# Patient Record
Sex: FEMALE | Race: WHITE | Employment: OTHER | URBAN - METROPOLITAN AREA
[De-identification: names, ages, dates, MRNs, and addresses within clinical notes are randomized per-mention and may not be internally consistent; named-entity substitution may affect disease eponyms.]

---

## 2020-10-22 LAB
HBV SURFACE AG SERPL QL IA: NONREACTIVE
HIV 1+2 AB+HIV1 P24 AG SERPL QL IA: NONREACTIVE

## 2021-02-25 LAB — GLU GEST SCREEN 1HR 50G: 115

## 2021-04-30 LAB — GROUP B STREP PCR: NEGATIVE

## 2021-06-10 ENCOUNTER — ANESTHESIA EVENT (OUTPATIENT)
Dept: OBGYN | Facility: CLINIC | Age: 31
End: 2021-06-10
Payer: COMMERCIAL

## 2021-06-10 ENCOUNTER — ANESTHESIA (OUTPATIENT)
Dept: OBGYN | Facility: CLINIC | Age: 31
End: 2021-06-10
Payer: COMMERCIAL

## 2021-06-10 ENCOUNTER — HOSPITAL ENCOUNTER (INPATIENT)
Facility: CLINIC | Age: 31
LOS: 2 days | Discharge: HOME | End: 2021-06-12
Attending: ADVANCED PRACTICE MIDWIFE | Admitting: ADVANCED PRACTICE MIDWIFE
Payer: COMMERCIAL

## 2021-06-10 PROBLEM — O16.3 ELEVATED BLOOD PRESSURE AFFECTING PREGNANCY IN THIRD TRIMESTER, ANTEPARTUM: Status: ACTIVE | Noted: 2021-06-10

## 2021-06-10 PROBLEM — O13.3 GESTATIONAL HYPERTENSION, THIRD TRIMESTER: Status: ACTIVE | Noted: 2021-06-10

## 2021-06-10 PROBLEM — Z34.93 PREGNANCY WITH PRENATAL CARE ELSEWHERE IN THIRD TRIMESTER: Status: ACTIVE | Noted: 2021-06-10

## 2021-06-10 LAB
ABO + RH BLD: NORMAL
ABO + RH BLD: NORMAL
ALT SERPL W P-5'-P-CCNC: 23 U/L (ref 0–50)
ANION GAP SERPL CALCULATED.3IONS-SCNC: 11 MMOL/L (ref 3–14)
AST SERPL W P-5'-P-CCNC: 17 U/L (ref 0–45)
BASOPHILS # BLD AUTO: 0 10E9/L (ref 0–0.2)
BASOPHILS NFR BLD AUTO: 0.2 %
BLD GP AB SCN SERPL QL: NORMAL
BLOOD BANK CMNT PATIENT-IMP: NORMAL
BUN SERPL-MCNC: 8 MG/DL (ref 7–30)
CALCIUM SERPL-MCNC: 9.4 MG/DL (ref 8.5–10.1)
CHLORIDE SERPL-SCNC: 102 MMOL/L (ref 94–109)
CO2 SERPL-SCNC: 19 MMOL/L (ref 20–32)
CREAT SERPL-MCNC: 0.7 MG/DL (ref 0.52–1.04)
CREAT UR-MCNC: 39 MG/DL
DIFFERENTIAL METHOD BLD: ABNORMAL
EOSINOPHIL # BLD AUTO: 0 10E9/L (ref 0–0.7)
EOSINOPHIL NFR BLD AUTO: 0 %
ERYTHROCYTE [DISTWIDTH] IN BLOOD BY AUTOMATED COUNT: 12.9 % (ref 10–15)
GFR SERPL CREATININE-BSD FRML MDRD: >90 ML/MIN/{1.73_M2}
GLUCOSE SERPL-MCNC: 107 MG/DL (ref 70–99)
HCT VFR BLD AUTO: 37.8 % (ref 35–47)
HGB BLD-MCNC: 12.9 G/DL (ref 11.7–15.7)
IMM GRANULOCYTES # BLD: 0.1 10E9/L (ref 0–0.4)
IMM GRANULOCYTES NFR BLD: 0.6 %
LABORATORY COMMENT REPORT: NORMAL
LYMPHOCYTES # BLD AUTO: 1.8 10E9/L (ref 0.8–5.3)
LYMPHOCYTES NFR BLD AUTO: 8.8 %
MCH RBC QN AUTO: 30.4 PG (ref 26.5–33)
MCHC RBC AUTO-ENTMCNC: 34.1 G/DL (ref 31.5–36.5)
MCV RBC AUTO: 89 FL (ref 78–100)
MONOCYTES # BLD AUTO: 0.7 10E9/L (ref 0–1.3)
MONOCYTES NFR BLD AUTO: 3.6 %
NEUTROPHILS # BLD AUTO: 17.4 10E9/L (ref 1.6–8.3)
NEUTROPHILS NFR BLD AUTO: 86.8 %
NRBC # BLD AUTO: 0 10*3/UL
NRBC BLD AUTO-RTO: 0 /100
PLATELET # BLD AUTO: 186 10E9/L (ref 150–450)
POTASSIUM SERPL-SCNC: 3.8 MMOL/L (ref 3.4–5.3)
PROT UR-MCNC: 0.12 G/L
PROT/CREAT 24H UR: 0.31 G/G CR (ref 0–0.2)
RBC # BLD AUTO: 4.24 10E12/L (ref 3.8–5.2)
SARS-COV-2 RNA RESP QL NAA+PROBE: NEGATIVE
SODIUM SERPL-SCNC: 132 MMOL/L (ref 133–144)
SPECIMEN EXP DATE BLD: NORMAL
SPECIMEN SOURCE: NORMAL
URATE SERPL-MCNC: 6.2 MG/DL (ref 2.6–6)
WBC # BLD AUTO: 20.1 10E9/L (ref 4–11)

## 2021-06-10 PROCEDURE — 84156 ASSAY OF PROTEIN URINE: CPT | Performed by: ADVANCED PRACTICE MIDWIFE

## 2021-06-10 PROCEDURE — 86901 BLOOD TYPING SEROLOGIC RH(D): CPT | Performed by: ADVANCED PRACTICE MIDWIFE

## 2021-06-10 PROCEDURE — 86780 TREPONEMA PALLIDUM: CPT | Performed by: ADVANCED PRACTICE MIDWIFE

## 2021-06-10 PROCEDURE — 250N000011 HC RX IP 250 OP 636: Performed by: STUDENT IN AN ORGANIZED HEALTH CARE EDUCATION/TRAINING PROGRAM

## 2021-06-10 PROCEDURE — 250N000009 HC RX 250: Performed by: STUDENT IN AN ORGANIZED HEALTH CARE EDUCATION/TRAINING PROGRAM

## 2021-06-10 PROCEDURE — 86850 RBC ANTIBODY SCREEN: CPT | Performed by: ADVANCED PRACTICE MIDWIFE

## 2021-06-10 PROCEDURE — 258N000003 HC RX IP 258 OP 636

## 2021-06-10 PROCEDURE — 370N000003 HC ANESTHESIA WARD SERVICE

## 2021-06-10 PROCEDURE — 80048 BASIC METABOLIC PNL TOTAL CA: CPT | Performed by: ADVANCED PRACTICE MIDWIFE

## 2021-06-10 PROCEDURE — 84550 ASSAY OF BLOOD/URIC ACID: CPT | Performed by: ADVANCED PRACTICE MIDWIFE

## 2021-06-10 PROCEDURE — 120N000002 HC R&B MED SURG/OB UMMC

## 2021-06-10 PROCEDURE — 84460 ALANINE AMINO (ALT) (SGPT): CPT | Performed by: ADVANCED PRACTICE MIDWIFE

## 2021-06-10 PROCEDURE — 87635 SARS-COV-2 COVID-19 AMP PRB: CPT | Performed by: ADVANCED PRACTICE MIDWIFE

## 2021-06-10 PROCEDURE — 258N000003 HC RX IP 258 OP 636: Performed by: ADVANCED PRACTICE MIDWIFE

## 2021-06-10 PROCEDURE — 85025 COMPLETE CBC W/AUTO DIFF WBC: CPT | Performed by: ADVANCED PRACTICE MIDWIFE

## 2021-06-10 PROCEDURE — 3E0R3BZ INTRODUCTION OF ANESTHETIC AGENT INTO SPINAL CANAL, PERCUTANEOUS APPROACH: ICD-10-PCS | Performed by: ADVANCED PRACTICE MIDWIFE

## 2021-06-10 PROCEDURE — 84450 TRANSFERASE (AST) (SGOT): CPT | Performed by: ADVANCED PRACTICE MIDWIFE

## 2021-06-10 PROCEDURE — 00HU33Z INSERTION OF INFUSION DEVICE INTO SPINAL CANAL, PERCUTANEOUS APPROACH: ICD-10-PCS | Performed by: ADVANCED PRACTICE MIDWIFE

## 2021-06-10 PROCEDURE — 86900 BLOOD TYPING SEROLOGIC ABO: CPT | Performed by: ADVANCED PRACTICE MIDWIFE

## 2021-06-10 RX ORDER — MISOPROSTOL 200 UG/1
TABLET ORAL
Status: DISCONTINUED
Start: 2021-06-10 | End: 2021-06-11 | Stop reason: HOSPADM

## 2021-06-10 RX ORDER — OXYTOCIN/0.9 % SODIUM CHLORIDE 30/500 ML
1-24 PLASTIC BAG, INJECTION (ML) INTRAVENOUS CONTINUOUS
Status: DISCONTINUED | OUTPATIENT
Start: 2021-06-10 | End: 2021-06-11

## 2021-06-10 RX ORDER — FENTANYL CITRATE 50 UG/ML
INJECTION, SOLUTION INTRAMUSCULAR; INTRAVENOUS PRN
Status: DISCONTINUED | OUTPATIENT
Start: 2021-06-10 | End: 2021-06-11

## 2021-06-10 RX ORDER — MAGNESIUM SULFATE HEPTAHYDRATE 40 MG/ML
4 INJECTION, SOLUTION INTRAVENOUS
Status: DISCONTINUED | OUTPATIENT
Start: 2021-06-10 | End: 2021-06-11

## 2021-06-10 RX ORDER — OXYCODONE AND ACETAMINOPHEN 5; 325 MG/1; MG/1
1 TABLET ORAL
Status: DISCONTINUED | OUTPATIENT
Start: 2021-06-10 | End: 2021-06-11

## 2021-06-10 RX ORDER — CALCIUM GLUCONATE 94 MG/ML
1 INJECTION, SOLUTION INTRAVENOUS
Status: DISCONTINUED | OUTPATIENT
Start: 2021-06-10 | End: 2021-06-11

## 2021-06-10 RX ORDER — NALOXONE HYDROCHLORIDE 0.4 MG/ML
0.4 INJECTION, SOLUTION INTRAMUSCULAR; INTRAVENOUS; SUBCUTANEOUS
Status: DISCONTINUED | OUTPATIENT
Start: 2021-06-10 | End: 2021-06-11

## 2021-06-10 RX ORDER — PRENATAL VIT/IRON FUM/FOLIC AC 27MG-0.8MG
1 TABLET ORAL DAILY
COMMUNITY

## 2021-06-10 RX ORDER — SODIUM CHLORIDE, SODIUM LACTATE, POTASSIUM CHLORIDE, CALCIUM CHLORIDE 600; 310; 30; 20 MG/100ML; MG/100ML; MG/100ML; MG/100ML
INJECTION, SOLUTION INTRAVENOUS
Status: COMPLETED
Start: 2021-06-10 | End: 2021-06-10

## 2021-06-10 RX ORDER — HYDRALAZINE HYDROCHLORIDE 20 MG/ML
10 INJECTION INTRAMUSCULAR; INTRAVENOUS
Status: DISCONTINUED | OUTPATIENT
Start: 2021-06-10 | End: 2021-06-11

## 2021-06-10 RX ORDER — CARBOPROST TROMETHAMINE 250 UG/ML
250 INJECTION, SOLUTION INTRAMUSCULAR
Status: DISCONTINUED | OUTPATIENT
Start: 2021-06-10 | End: 2021-06-11

## 2021-06-10 RX ORDER — OMEGA-3 FATTY ACIDS/FISH OIL 300-1000MG
CAPSULE ORAL
COMMUNITY

## 2021-06-10 RX ORDER — NALOXONE HYDROCHLORIDE 0.4 MG/ML
0.2 INJECTION, SOLUTION INTRAMUSCULAR; INTRAVENOUS; SUBCUTANEOUS
Status: DISCONTINUED | OUTPATIENT
Start: 2021-06-10 | End: 2021-06-11

## 2021-06-10 RX ORDER — IBUPROFEN 800 MG/1
800 TABLET, FILM COATED ORAL
Status: DISCONTINUED | OUTPATIENT
Start: 2021-06-10 | End: 2021-06-11

## 2021-06-10 RX ORDER — LIDOCAINE 40 MG/G
CREAM TOPICAL
Status: DISCONTINUED | OUTPATIENT
Start: 2021-06-10 | End: 2021-06-11

## 2021-06-10 RX ORDER — LIDOCAINE HYDROCHLORIDE 10 MG/ML
INJECTION, SOLUTION EPIDURAL; INFILTRATION; INTRACAUDAL; PERINEURAL
Status: DISCONTINUED
Start: 2021-06-10 | End: 2021-06-11 | Stop reason: HOSPADM

## 2021-06-10 RX ORDER — NALBUPHINE HYDROCHLORIDE 10 MG/ML
2.5-5 INJECTION, SOLUTION INTRAMUSCULAR; INTRAVENOUS; SUBCUTANEOUS EVERY 6 HOURS PRN
Status: DISCONTINUED | OUTPATIENT
Start: 2021-06-10 | End: 2021-06-11

## 2021-06-10 RX ORDER — OXYTOCIN/0.9 % SODIUM CHLORIDE 30/500 ML
100-340 PLASTIC BAG, INJECTION (ML) INTRAVENOUS CONTINUOUS PRN
Status: COMPLETED | OUTPATIENT
Start: 2021-06-10 | End: 2021-06-11

## 2021-06-10 RX ORDER — MAGNESIUM SULFATE HEPTAHYDRATE 500 MG/ML
4 INJECTION, SOLUTION INTRAMUSCULAR; INTRAVENOUS
Status: DISCONTINUED | OUTPATIENT
Start: 2021-06-10 | End: 2021-06-11

## 2021-06-10 RX ORDER — MAGNESIUM SULFATE HEPTAHYDRATE 40 MG/ML
2 INJECTION, SOLUTION INTRAVENOUS
Status: DISCONTINUED | OUTPATIENT
Start: 2021-06-10 | End: 2021-06-11

## 2021-06-10 RX ORDER — ACETAMINOPHEN 325 MG/1
650 TABLET ORAL EVERY 4 HOURS PRN
Status: DISCONTINUED | OUTPATIENT
Start: 2021-06-10 | End: 2021-06-11

## 2021-06-10 RX ORDER — ONDANSETRON 2 MG/ML
4 INJECTION INTRAMUSCULAR; INTRAVENOUS EVERY 6 HOURS PRN
Status: DISCONTINUED | OUTPATIENT
Start: 2021-06-10 | End: 2021-06-11

## 2021-06-10 RX ORDER — HYDRALAZINE HYDROCHLORIDE 20 MG/ML
5 INJECTION INTRAMUSCULAR; INTRAVENOUS
Status: DISCONTINUED | OUTPATIENT
Start: 2021-06-10 | End: 2021-06-11

## 2021-06-10 RX ORDER — NIFEDIPINE 10 MG/1
10 CAPSULE ORAL
Status: DISCONTINUED | OUTPATIENT
Start: 2021-06-10 | End: 2021-06-11

## 2021-06-10 RX ORDER — TRANEXAMIC ACID 10 MG/ML
1 INJECTION, SOLUTION INTRAVENOUS EVERY 30 MIN PRN
Status: DISCONTINUED | OUTPATIENT
Start: 2021-06-10 | End: 2021-06-11

## 2021-06-10 RX ORDER — EPHEDRINE SULFATE 50 MG/ML
5 INJECTION, SOLUTION INTRAMUSCULAR; INTRAVENOUS; SUBCUTANEOUS
Status: DISCONTINUED | OUTPATIENT
Start: 2021-06-10 | End: 2021-06-11

## 2021-06-10 RX ORDER — OXYTOCIN 10 [USP'U]/ML
INJECTION, SOLUTION INTRAMUSCULAR; INTRAVENOUS
Status: DISCONTINUED
Start: 2021-06-10 | End: 2021-06-11 | Stop reason: HOSPADM

## 2021-06-10 RX ORDER — LIDOCAINE HYDROCHLORIDE AND EPINEPHRINE 15; 5 MG/ML; UG/ML
INJECTION, SOLUTION EPIDURAL PRN
Status: DISCONTINUED | OUTPATIENT
Start: 2021-06-10 | End: 2021-06-11

## 2021-06-10 RX ORDER — METHYLERGONOVINE MALEATE 0.2 MG/ML
200 INJECTION INTRAVENOUS
Status: DISCONTINUED | OUTPATIENT
Start: 2021-06-10 | End: 2021-06-11

## 2021-06-10 RX ORDER — MAGNESIUM SULFATE IN WATER 40 MG/ML
2 INJECTION, SOLUTION INTRAVENOUS PRN
Status: DISCONTINUED | OUTPATIENT
Start: 2021-06-10 | End: 2021-06-11

## 2021-06-10 RX ORDER — SODIUM CHLORIDE, SODIUM LACTATE, POTASSIUM CHLORIDE, CALCIUM CHLORIDE 600; 310; 30; 20 MG/100ML; MG/100ML; MG/100ML; MG/100ML
10-125 INJECTION, SOLUTION INTRAVENOUS CONTINUOUS
Status: DISCONTINUED | OUTPATIENT
Start: 2021-06-10 | End: 2021-06-11

## 2021-06-10 RX ORDER — LORAZEPAM 2 MG/ML
2 INJECTION INTRAMUSCULAR
Status: DISCONTINUED | OUTPATIENT
Start: 2021-06-10 | End: 2021-06-11

## 2021-06-10 RX ORDER — FENTANYL CITRATE 50 UG/ML
50-100 INJECTION, SOLUTION INTRAMUSCULAR; INTRAVENOUS
Status: DISCONTINUED | OUTPATIENT
Start: 2021-06-10 | End: 2021-06-11

## 2021-06-10 RX ORDER — OXYTOCIN 10 [USP'U]/ML
10 INJECTION, SOLUTION INTRAMUSCULAR; INTRAVENOUS
Status: DISCONTINUED | OUTPATIENT
Start: 2021-06-10 | End: 2021-06-11

## 2021-06-10 RX ORDER — OXYTOCIN/0.9 % SODIUM CHLORIDE 30/500 ML
PLASTIC BAG, INJECTION (ML) INTRAVENOUS
Status: DISCONTINUED
Start: 2021-06-10 | End: 2021-06-11 | Stop reason: HOSPADM

## 2021-06-10 RX ORDER — CITRIC ACID/SODIUM CITRATE 334-500MG
30 SOLUTION, ORAL ORAL ONCE
Status: DISCONTINUED | OUTPATIENT
Start: 2021-06-10 | End: 2021-06-11

## 2021-06-10 RX ADMIN — Medication 5 MG: at 15:56

## 2021-06-10 RX ADMIN — Medication: at 23:07

## 2021-06-10 RX ADMIN — LIDOCAINE HYDROCHLORIDE,EPINEPHRINE BITARTRATE 3 ML: 15; .005 INJECTION, SOLUTION EPIDURAL; INFILTRATION; INTRACAUDAL; PERINEURAL at 15:29

## 2021-06-10 RX ADMIN — Medication 8 ML: at 15:33

## 2021-06-10 RX ADMIN — SODIUM CHLORIDE, POTASSIUM CHLORIDE, SODIUM LACTATE AND CALCIUM CHLORIDE 125 ML/HR: 600; 310; 30; 20 INJECTION, SOLUTION INTRAVENOUS at 16:14

## 2021-06-10 RX ADMIN — Medication 5 MG: at 16:01

## 2021-06-10 RX ADMIN — SODIUM CHLORIDE, POTASSIUM CHLORIDE, SODIUM LACTATE AND CALCIUM CHLORIDE 125 ML/HR: 600; 310; 30; 20 INJECTION, SOLUTION INTRAVENOUS at 23:41

## 2021-06-10 RX ADMIN — FENTANYL CITRATE 16 MCG: 50 INJECTION, SOLUTION INTRAMUSCULAR; INTRAVENOUS at 15:33

## 2021-06-10 RX ADMIN — SODIUM CHLORIDE, POTASSIUM CHLORIDE, SODIUM LACTATE AND CALCIUM CHLORIDE 1000 ML: 600; 310; 30; 20 INJECTION, SOLUTION INTRAVENOUS at 15:14

## 2021-06-10 RX ADMIN — Medication: at 15:30

## 2021-06-10 RX ADMIN — SODIUM CHLORIDE, POTASSIUM CHLORIDE, SODIUM LACTATE AND CALCIUM CHLORIDE 1000 ML: 600; 310; 30; 20 INJECTION, SOLUTION INTRAVENOUS at 14:29

## 2021-06-10 ASSESSMENT — MIFFLIN-ST. JEOR: SCORE: 1607.65

## 2021-06-10 ASSESSMENT — ACTIVITIES OF DAILY LIVING (ADL)
FALL_HISTORY_WITHIN_LAST_SIX_MONTHS: NO
TOILETING_ISSUES: NO

## 2021-06-10 NOTE — PROGRESS NOTES
"Labor progress note    S: Pt resting in support hands and knees with CUB.   and partner at bedside.   Bedside RN updated CNM that Pr/Cr ratio returned diagnostic for Pre E, asymptomatic.   O:  Blood pressure 103/52, pulse 95, temperature 98.3  F (36.8  C), temperature source Oral, resp. rate 18, height 1.727 m (5' 8\"), weight 83.9 kg (185 lb), last menstrual period 2020, SpO2 99 %.  General appearance: comfortable.  Contractions: Every 2-5 minutes.  seconds duration.  Palpate: strong.  Soft resting tone.   FHT: Baseline 125 with moderate variability. Accelerations are present. intermittent variable decelerations present.  ROM: light meconium fluid . Membranes have been ruptured for 7 hours.  PELVIC EXAM:deferred    Pitocin- none,  Antibiotics- none  IV at maintenance rate  Epidural in place, infusing`        # Pain Assessment:    - Zulema is experiencing pain due to labor. Pain management was discussed with Zulema and her significant other and kayla and the plan was created in a collaborative fashion.  Zulema's response to the current recommendations: engaged  - coping well with epidural         A:  30 year old  with IUP @ 41w6d active labor   Pre Eclampsia without severe features  Fetal Heart Rate Tracing category two  GBS- negative on  and      Patient Active Problem List   Diagnosis     Gestational hypertension, third trimester     Pregnancy with prenatal care elsewhere in third trimester         P:  On coming CNM to check SVE and discuss plan of care.   At this time no concern for fetal metabolic acidemia due to moderate variability and accelerations.Consult Category 2 FHR tracing algorithm  prn if persistent or worsening category 2 FHR tracing despite interventions remote from delivery.         India NICK, CNM              "

## 2021-06-10 NOTE — ANESTHESIA PROCEDURE NOTES
Epidural catheter Procedure Note  Pre-Procedure   Staff -        Anesthesiologist:  Natalie Davenport MD       Resident/Fellow: Shola Eddy DO       Performed By: resident       Location: OB       Procedure Start/Stop Times: 6/10/2021 3:20 PM and 6/10/2021 3:32 PM       Pre-Anesthestic Checklist: patient identified, IV checked, risks and benefits discussed, informed consent, monitors and equipment checked, pre-op evaluation, at physician/surgeon's request and post-op pain management  Timeout:       Correct Patient: Yes        Correct Procedure: Yes        Correct Site: Yes        Correct Position: Yes   Procedure Documentation  Procedure: epidural catheter       Patient Position: sitting       Patient Prep/Sterile Barriers: sterile gloves, mask, patient draped       Skin prep: Chloraprep       Local skin infiltrated with 2 mL of 2% lidocaine.        Insertion Site: L3-4. (midline approach).       Technique: LORT saline        FEIM at 6 cm.       Needle Type: Touhy needle       Needle Gauge: 17.        Needle Length (Inches): 3.5        Catheter: 19 G.         Catheter threaded easily.         4 cm epidural space.         Threaded 10 cm at skin.         # of attempts: 1 and  # of redirects:  0    Assessment/Narrative         Paresthesias: No.    Test dose of mL at.         Test dose negative, 3 minutes after injection, for signs of intravascular, subdural, or intrathecal injection.       Insertion/Infusion Method: LORT saline       Aspiration negative for Heme or CSF via Epidural Catheter.    Comments:  Comfortable

## 2021-06-10 NOTE — ANESTHESIA PREPROCEDURE EVALUATION
Anesthesia Pre-Procedure Evaluation    Patient: Zulema Layton   MRN: [id number] : 1990        Preoperative Diagnosis: * No surgery found *   Procedure :      Past Medical History:   Diagnosis Date     NO ACTIVE PROBLEMS       Past Surgical History:   Procedure Laterality Date     Tonsillectomy and adenectomy      age 5      Allergies   Allergen Reactions     Suprax Rash      Social History     Tobacco Use     Smoking status: Never Smoker   Substance Use Topics     Alcohol use: Yes     Comment: socially      Wt Readings from Last 1 Encounters:   06/10/21 83.9 kg (185 lb)        Anesthesia Evaluation   Pt has had prior anesthetic. Type: General (tonsillectomy).    No history of anesthetic complications       ROS/MED HX  ENT/Pulmonary:  - neg pulmonary ROS     Neurologic:  - neg neurologic ROS     Cardiovascular: Comment: Elevated blood pressures         METS/Exercise Tolerance:     Hematologic:  - neg hematologic  ROS     Musculoskeletal:       GI/Hepatic:  - neg GI/hepatic ROS     Renal/Genitourinary:       Endo:  - neg endo ROS     Psychiatric/Substance Use:  - neg psychiatric ROS     Infectious Disease:       Malignancy:       Other:   41 weeks 6 days           Physical Exam    Airway        Mallampati: II   TM distance: > 3 FB   Neck ROM: full   Mouth opening: > 3 cm    Respiratory Devices and Support         Dental  no notable dental history         Cardiovascular   cardiovascular exam normal          Pulmonary   pulmonary exam normal                OUTSIDE LABS:  CBC: No results found for: WBC, HGB, HCT, PLT  BMP: No results found for: NA, POTASSIUM, CHLORIDE, CO2, BUN, CR, GLC  COAGS: No results found for: PTT, INR, FIBR  POC: No results found for: BGM, HCG, HCGS  HEPATIC: No results found for: ALBUMIN, PROTTOTAL, ALT, AST, GGT, ALKPHOS, BILITOTAL, BILIDIRECT, RADHA  OTHER: No results found for: PH, LACT, A1C, JULIA, PHOS, MAG, LIPASE, AMYLASE, TSH, T4, T3, CRP, SED    Anesthesia Plan    ASA  Status:  2      Anesthesia Type: Epidural.              Consents    Anesthesia Plan(s) and associated risks, benefits, and realistic alternatives discussed. Questions answered and patient/representative(s) expressed understanding.     - Discussed with:  Patient         Postoperative Care            Comments:    Zulema Layton is a 30 year old F requesting an epidural for labor analgesia. Her past medical history includes elevated blood pressure with this pregnancy. We discussed the risks and benefits of labor epidurals including: post dural puncture headache, infection, epidural hematoma, failed or patchy epidural requiring replacement, and damage to nearby nerves/structures. Zulema Layton verbalized understanding of these risks. All questions were answered. She would like to proceed with the procedure.               Shola Eddy, DO

## 2021-06-10 NOTE — PROGRESS NOTES
"Subjective:  Zulema is resting in bed, more comfortable with epidural in place. Able to feel pressure with contractions, but no longer experiencing pain. Lower extremities with good movement, but notably \"heavy\" per pt.    Pt requesting cervical exam at 1545. During exam fetal deceleration noted down to 70's -80's, with maternal blood pressures down to 92/55 following epidural placement. FHR resolved with repositioning to hands and knees, IV fluid bolus, and ephedrine administration x2. Baseline returned to 140 with moderate variability.    During this episode partner, Archie, mentioned that Zulema has chronically had intermittent heart \"palpitations\" and expressed concern over ephedrine administration. Its use and side effects were reviewed with pt and partner at that time. Encouraged to notify staff if any concerns or changes.    Objective:  General appearance: comfortable  CONTACTIONS: every 2-3 minutes, not tracing due to deceleration episode  FETAL HEART TONES: continuous EFM- baseline 140 with moderate variability.  Accelerations- present.  Decelerations-  prolonged x1 following epidural initiation and maternal blood pressure decreasing.  ROM: moderate meconium fluid  PELVIC EXAM:6/ 90%/ Anterior/ soft/ 0     Pitocin - none    Antibiotics - none  IV at maintenance rate  Epidural in place, infusing   Ephedrine dose given by RN    # Pain Assessment:  Current Pain Score 6/10/2021   Patient currently in pain? denies   - Zulema is experiencing pain due to labor. Pain management was discussed and the plan was created in a collaborative fashion.  Zulema's response to the current recommendations: engaged  - Continue epidural    ASSESSMENT:  ==============  30 year old  with IUP @ 41w6d in active labor   Fetal Heart Rate Tracing category two   GBS- negative/  GHTN, asymptomatic, normotensive to mild range since admission  [facility] transfer  Patient Active Problem List   Diagnosis     Gestational hypertension, " third trimester     Pregnancy with prenatal care elsewhere in third trimester     PLAN:  ===========  Reviewed with patient, partner, and  rationale for interventions based on decelerations. All questions discussed and answered.   - Continue close surveillance of blood pressure.  MD consult prn if worsening hypertensive disorder   -Frequent position changes to facilitate labor with epidural anesthesia.  - will discuss GBS prophylaxis for /unknown GBS if ROM >18 hours  Reevaluate in 2hours and prn       I, Daisy Lakhani RN, am serving as a scribe; to document services personally performed by  India Gary CNM based on data collection and the provider's statements to me.     Daisy Lakhani RN      The encounter was performed by me and scribed by the SNM. The scribed note accurately reflects my personal services and decisions made by me. PARK Davidson CNM      Addendum:  6/10/2021 5:10 PM    Checked on pt. Sleeping in bed on right side. EFM has maintained category one strip since deceleration episode noted above, with baseline of 130, moderate variability, and no decelerations.  Reassess in 1 hour and prn.     Daisy SCHWARTZ SNM, am serving as a scribe; to document services personally performed by  India Gary CNM based on data collection and the provider's statements to me.     DENIZ Gallagher    The encounter was performed by me and scribed by the SNM. The scribed note accurately reflects my personal services and decisions made by me.  PARK Davidson CNM

## 2021-06-10 NOTE — H&P
ADMIT NOTE  =================  41 weeks 6 days    Zulema Layton is a 30 year old female with an LMP of 2020. Patient is pregnant. Estimated Date of Delivery: 2021 is admitted to the [facility] on 6/10/2021 at 2:20 PM with elevated BP    HPI  ================  Patient with planned Out of Hospital Birth is transferring by car to the hospital for elevated blood pressures and desire for epidural.  Patient has been seen by [facility] for prenatal care.  Transferring midwife name(s),/birth center & phone number: Ramya [phone]  Records received, reviewed and sent for scanning.     Pt was repotted to be 5cm/90/0 at 1300 with labor having started midnight on .  Reported AROM for meconium-stained fluid  at 11:30 by [facility] team.  Had non sustained severe range BP at [facility], asymptomatic     Contractions- strong, regular  Fetal movement- active  ROM- yes, meconium. AROM @ 1130 before transfer  Vaginal bleeding- exam deferred until epidural  GBS- negative on 21 and  21  FOB- is involved, Archie at bedside  Other labor support- : Jacquelyn Romero  Pt  works as a chiropractor.    Weight gain- 185 - 140 lbs, Total weight gain- 45 lbs  Height- 68 in  BMI- 28  First prenatal visit at 8 weeks, Total visits- 14  Tdap 21  +HPV on pap  follow was negative in  , last pap        PROBLEM LIST  =================  Patient Active Problem List    Diagnosis Date Noted     Elevated blood pressure affecting pregnancy in third trimester, antepartum 06/10/2021     Priority: Medium       HISTORIES  ============  Allergies   Allergen Reactions     Suprax Rash     Past Medical History:   Diagnosis Date     NO ACTIVE PROBLEMS      Past Surgical History:   Procedure Laterality Date     Tonsillectomy and adenectomy      age 5   .  Family History   Problem Relation Age of Onset     Diabetes Maternal Grandfather      Cancer Maternal Grandfather         lung     Breast Cancer Paternal Aunt       Anesthesia Reaction Mother      Connective Tissue Disorder Mother         Fibromyalgia     Endocrine Disease Mother      Psychotic Disorder Mother         Anxiety and depression     Social History     Tobacco Use     Smoking status: Never Smoker     Smokeless tobacco: Never Used   Substance Use Topics     Alcohol use: Yes     Comment: socially     OB History    Para Term  AB Living   1 0 0 0 0 0   SAB TAB Ectopic Multiple Live Births   0 0 0 0 0      # Outcome Date GA Lbr Pete/2nd Weight Sex Delivery Anes PTL Lv   1 Current                 LABS:   ===========  Prenatal Labs reviewed per transfer records: [facility].  Rhogam not indicated  10/2020    - ABO/ RH: O positive, antibody negative   - Rubella immune    - HBsAg: negative    - HIV: negative    - RPR: NR    - GC/CT negative   - Hep C NR   - Hg 12, Plt normal     - GCT: date 2021, result 115   - 2021: Hg 12.7, Plt 195    - GBS: date 2021, result negative      ULTRASOUND  =============  Date 2021, result 05/15/2021    ROS  =========  Pt denies significant respiratory, cardiovacular, GI, or muscular/skeletalcomplaints.    See RN data base ROS.     PHYSICAL EXAM:  ===============  /78, HR 83, Temp 98.6F orally. 02 100% on RA    General appearance: uncomfortable with contractions, coping well overall with  supports  GENERAL APPEARANCE: healthy, alert and mild distress  RESP: RR 18, nonlabored breathing between contractions  CV: Denies CP. BP elevated on admission, see above.  NEURO: Denies headache, blurred vision, other vision changes  PSYCH: mentation appears normal, coping with labor    Abdomen: gravid, vertex fetus per Leopold's, non-tender between contractions.   Deferred BSUS positioning confirmation until after comfortable epidural  CONTACTIONS: every 2-4 minutes x   FETAL HEART TONES: continuous EFM- baseline 145 with moderate variability and positive accelerations. No decelerations.  PELVIC  EXAM:Deferred until epidural  TURK SCORE: Deferred until epidural   BLOODY SHOW: deferred   ROM:yes moderate, meconium. SROM @ 1130  FLUID: mecomium-stained per transferring midwife    # Pain Assessment:  Current Pain Score 6/10/2021   Patient currently in pain? yes   - Zulema is experiencing pain due to labor. Pain management was discussed and the plan was created in a collaborative fashion.  Zulema's response to the current recommendations: engaged  - Epidural per pt preference      ASSESSMENT:  ==============  30 year old  IUP @ 41 weeks 6 days   Elevated BP, Pre E labs in process, asymptomatic  Meconium Fluid. ROM <18 hours, afebrile  Early labor   Transfer [facility] Care from [facility]  Fetal Heart Rate - category one  GBS- negative,    COVID pending     PLAN:  ===========  -Admit - see IP orders  -Pain medication options reviewed. Pt is interested in epidural; ordered. Provider to provider report given to anesthesia. Reviewed recent blood pressure, pre-e labs and COVID in process.  - Continue close surveillance of blood pressure.  MD consult prn if worsening hypertensive disorder prn if remote from delivery. courtesy update on non-sustained severe range blood pressure in birth center.   - Position changes to facilitate fetal rtation/descent once comfortable.  Plan SVE and BSUS for position.   - Observation and reevaluate once comfortable and prn.    I, DENIZ Gallagher, am serving as a scribe; to document services personally performed by  India Gary CNM based on data collection and the provider's statements to me.     DENIZ Gallagher      The encounter was performed by me and scribed by the SNM. The scribed note accurately reflects my personal services and decisions made by me.  PARK Davidson CNM

## 2021-06-11 LAB
ERYTHROCYTE [DISTWIDTH] IN BLOOD BY AUTOMATED COUNT: 13.1 % (ref 10–15)
HCT VFR BLD AUTO: 33.2 % (ref 35–47)
HGB BLD-MCNC: 11.2 G/DL (ref 11.7–15.7)
MCH RBC QN AUTO: 30.9 PG (ref 26.5–33)
MCHC RBC AUTO-ENTMCNC: 33.7 G/DL (ref 31.5–36.5)
MCV RBC AUTO: 92 FL (ref 78–100)
PLATELET # BLD AUTO: 173 10E9/L (ref 150–450)
RBC # BLD AUTO: 3.63 10E12/L (ref 3.8–5.2)
T PALLIDUM AB SER QL: NONREACTIVE
WBC # BLD AUTO: 17.2 10E9/L (ref 4–11)

## 2021-06-11 PROCEDURE — 36415 COLL VENOUS BLD VENIPUNCTURE: CPT | Performed by: ADVANCED PRACTICE MIDWIFE

## 2021-06-11 PROCEDURE — 250N000009 HC RX 250: Performed by: ADVANCED PRACTICE MIDWIFE

## 2021-06-11 PROCEDURE — 0KQM0ZZ REPAIR PERINEUM MUSCLE, OPEN APPROACH: ICD-10-PCS | Performed by: ADVANCED PRACTICE MIDWIFE

## 2021-06-11 PROCEDURE — 722N000001 HC LABOR CARE VAGINAL DELIVERY SINGLE

## 2021-06-11 PROCEDURE — 85027 COMPLETE CBC AUTOMATED: CPT | Performed by: ADVANCED PRACTICE MIDWIFE

## 2021-06-11 PROCEDURE — 59409 OBSTETRICAL CARE: CPT | Performed by: ADVANCED PRACTICE MIDWIFE

## 2021-06-11 PROCEDURE — 250N000013 HC RX MED GY IP 250 OP 250 PS 637: Performed by: ADVANCED PRACTICE MIDWIFE

## 2021-06-11 PROCEDURE — 120N000002 HC R&B MED SURG/OB UMMC

## 2021-06-11 RX ORDER — OXYTOCIN/0.9 % SODIUM CHLORIDE 30/500 ML
100 PLASTIC BAG, INJECTION (ML) INTRAVENOUS CONTINUOUS
Status: DISCONTINUED | OUTPATIENT
Start: 2021-06-11 | End: 2021-06-12 | Stop reason: HOSPADM

## 2021-06-11 RX ORDER — TRANEXAMIC ACID 10 MG/ML
1 INJECTION, SOLUTION INTRAVENOUS EVERY 30 MIN PRN
Status: DISCONTINUED | OUTPATIENT
Start: 2021-06-11 | End: 2021-06-12 | Stop reason: HOSPADM

## 2021-06-11 RX ORDER — IBUPROFEN 800 MG/1
800 TABLET, FILM COATED ORAL EVERY 6 HOURS PRN
Status: DISCONTINUED | OUTPATIENT
Start: 2021-06-11 | End: 2021-06-12 | Stop reason: HOSPADM

## 2021-06-11 RX ORDER — OXYTOCIN/0.9 % SODIUM CHLORIDE 30/500 ML
340 PLASTIC BAG, INJECTION (ML) INTRAVENOUS CONTINUOUS PRN
Status: DISCONTINUED | OUTPATIENT
Start: 2021-06-11 | End: 2021-06-12 | Stop reason: HOSPADM

## 2021-06-11 RX ORDER — IBUPROFEN 200 MG
600 TABLET ORAL EVERY 6 HOURS PRN
Qty: 50 TABLET | Refills: 0 | COMMUNITY
Start: 2021-06-11

## 2021-06-11 RX ORDER — ACETAMINOPHEN 325 MG/1
650 TABLET ORAL EVERY 4 HOURS PRN
Qty: 50 TABLET | Refills: 0 | COMMUNITY
Start: 2021-06-11

## 2021-06-11 RX ORDER — AMOXICILLIN 250 MG
2 CAPSULE ORAL 2 TIMES DAILY
Status: DISCONTINUED | OUTPATIENT
Start: 2021-06-11 | End: 2021-06-12 | Stop reason: HOSPADM

## 2021-06-11 RX ORDER — ACETAMINOPHEN 325 MG/1
650 TABLET ORAL EVERY 4 HOURS PRN
Status: DISCONTINUED | OUTPATIENT
Start: 2021-06-11 | End: 2021-06-12 | Stop reason: HOSPADM

## 2021-06-11 RX ORDER — AMOXICILLIN 250 MG
1 CAPSULE ORAL DAILY PRN
Qty: 20 TABLET | Refills: 0 | COMMUNITY
Start: 2021-06-11

## 2021-06-11 RX ORDER — BISACODYL 10 MG
10 SUPPOSITORY, RECTAL RECTAL DAILY PRN
Status: DISCONTINUED | OUTPATIENT
Start: 2021-06-13 | End: 2021-06-12 | Stop reason: HOSPADM

## 2021-06-11 RX ORDER — HYDROCORTISONE 2.5 %
CREAM (GRAM) TOPICAL 3 TIMES DAILY PRN
Status: DISCONTINUED | OUTPATIENT
Start: 2021-06-11 | End: 2021-06-12 | Stop reason: HOSPADM

## 2021-06-11 RX ORDER — AMOXICILLIN 250 MG
1 CAPSULE ORAL 2 TIMES DAILY
Status: DISCONTINUED | OUTPATIENT
Start: 2021-06-11 | End: 2021-06-12 | Stop reason: HOSPADM

## 2021-06-11 RX ORDER — OXYTOCIN 10 [USP'U]/ML
10 INJECTION, SOLUTION INTRAMUSCULAR; INTRAVENOUS
Status: DISCONTINUED | OUTPATIENT
Start: 2021-06-11 | End: 2021-06-12 | Stop reason: HOSPADM

## 2021-06-11 RX ORDER — MODIFIED LANOLIN
OINTMENT (GRAM) TOPICAL
Status: DISCONTINUED | OUTPATIENT
Start: 2021-06-11 | End: 2021-06-12 | Stop reason: HOSPADM

## 2021-06-11 RX ADMIN — ACETAMINOPHEN 650 MG: 325 TABLET, FILM COATED ORAL at 11:40

## 2021-06-11 RX ADMIN — IBUPROFEN 800 MG: 800 TABLET, FILM COATED ORAL at 20:09

## 2021-06-11 RX ADMIN — DOCUSATE SODIUM AND SENNOSIDES 1 TABLET: 8.6; 5 TABLET ORAL at 21:10

## 2021-06-11 RX ADMIN — ACETAMINOPHEN 650 MG: 325 TABLET, FILM COATED ORAL at 16:10

## 2021-06-11 RX ADMIN — ACETAMINOPHEN 650 MG: 325 TABLET, FILM COATED ORAL at 20:09

## 2021-06-11 RX ADMIN — IBUPROFEN 800 MG: 800 TABLET, FILM COATED ORAL at 06:20

## 2021-06-11 RX ADMIN — Medication 340 ML/HR: at 04:46

## 2021-06-11 RX ADMIN — IBUPROFEN 800 MG: 800 TABLET, FILM COATED ORAL at 13:28

## 2021-06-11 RX ADMIN — DOCUSATE SODIUM AND SENNOSIDES 1 TABLET: 8.6; 5 TABLET ORAL at 09:29

## 2021-06-11 RX ADMIN — Medication 2 MILLI-UNITS/MIN: at 01:58

## 2021-06-11 NOTE — PLAN OF CARE
Data: Zulema Layton transferred to [facility] via wheelchair at 0845 Baby transferred via parent's arms.  Action: Receiving unit notified of transfer: Yes. Patient and family notified of room change. Report given to Ita at 0800 Belongings sent to receiving unit. Accompanied by Registered Nurse. Oriented patient to surroundings. Call light within reach. ID bands double-checked with receiving RN.  Response: Patient tolerated transfer and is stable.

## 2021-06-11 NOTE — PROGRESS NOTES
"Blood pressure 129/66, pulse 95, temperature 98.3  F (36.8  C), temperature source Oral, resp. rate 22, height 1.727 m (5' 8\"), weight 83.9 kg (185 lb), last menstrual period 08/21/2020, SpO2 98 %.    Called to room for FHR deceleration, upon entry to room patient in hands and knees position and FHR had recovered to baseline.  Fluid bolus running and Pitocin was shut off.   General appearance: comfortable  CONTRACTIONS: every 2-4 minutes and strong  Pitocin- none,  Antibiotics- none  FETAL HEART TONES: continuous EFM- baseline 145 with moderate variability and positive accelerations. Prolonged decelerations x 6 minutes with kath to 70's.   ROM: meconium fluid  PELVIC EXAM: 9/ 100%/ +1    ASSESSMENT:  ==============  IUP @ 42w0d in active labor   Fetal Heart Rate Tracing category two  GBS- negative    PLAN:  ===========  Continue intrauterine resuscitative measures as needed for Cat II tracing.   Frequent position changes to facilitate labor with epidural anesthesia.  Reevaluate in 1 hours prn     PARK Jones CNM    "

## 2021-06-11 NOTE — PROGRESS NOTES
"Blood pressure 115/70, pulse 95, temperature 98.5  F (36.9  C), temperature source Oral, resp. rate 20, height 1.727 m (5' 8\"), weight 83.9 kg (185 lb), last menstrual period 08/21/2020, SpO2 98 %.    General appearance: comfortable with epidural. Feels mild pressure in abdomen and L hip, but denies rectal pressure.  CONTRACTIONS: every 2-5 minutes and strong  Pitocin- none,  Antibiotics- none  FETAL HEART TONES: continuous EFM- baseline 135 with moderate variability and positive accelerations. No decelerations.  ROM: light meconium fluid   PELVIC EXAM: 9/ 100%/ +1     ASSESSMENT:  ==============  IUP @ 42w0d in active labor   Fetal Heart Rate Tracing category one  GBS- negative    PLAN:  ===========  Frequent position changes to facilitate labor with epidural anesthesia.  Labor augmentation with Pitocin reviewed with pt. Agreeable to plan.   Reevaluate in 1-2 hours prPARK DickersonM    "

## 2021-06-11 NOTE — PROGRESS NOTES
"Blood pressure 116/67, pulse 95, temperature 98.5  F (36.9  C), temperature source Oral, resp. rate 14, height 1.727 m (5' 8\"), weight 83.9 kg (185 lb), last menstrual period 08/21/2020, SpO2 100 %.    General appearance: comfortable with epidural.  Support team bedside, has been changing positions frequently.  CONTRACTIONS: every 2-6 minutes and strong  Pitocin- none,  Antibiotics- none  FETAL HEART TONES: continuous EFM- baseline 140 with moderate variability and positive accelerations. Occasional variable decelerations.  ROM: meconium-stained fluid fluid   PELVIC EXAM: 9/ 100%/ 0 to +1    ASSESSMENT:  ==============  IUP @ 42w0d in active labor   Fetal Heart Rate Tracing overall category one  GBS- negative  ROM x 12.5 hours, afebrile    PLAN:  ===========  Discussed labor progress, continued position changes, and current contraction pattern. Consider Pitocin prn.   Frequent position changes to facilitate labor with epidural anesthesia.  Reevaluate in 2 hours prn     PARK Jones CNM    "

## 2021-06-11 NOTE — PLAN OF CARE
Patient comfortable with epidural. Normal progression of labor. No IV pitocin at this point. Will start if labor progress slows. Primarily Cat 1 tracing with intermittent Cat 2 due to intermittent variable decels. One prolonged deceleration at beginning of shift due to hypotension s/p epidural placement. Two doses of ephedrine given. Pre-E diagnosis given d/t proteinuria. Patient denies HA, vision changes, increased swelling, RUQ pain, N/V. BP's normotensive. Adequate UOP. No s/s of worsening Pre-E. Patient has support of spouse and  who are providing great support. Frequent position changes to facilitate adequate labor progression. Continue with current POC.

## 2021-06-11 NOTE — PROGRESS NOTES
"Blood pressure 116/60, pulse 95, temperature 98.2  F (36.8  C), temperature source Oral, resp. rate 16, height 1.727 m (5' 8\"), weight 83.9 kg (185 lb), last menstrual period 08/21/2020, SpO2 99 %.  Patient Vitals for the past 24 hrs:   BP Temp Temp src Pulse Resp SpO2 Height Weight   06/10/21 1845 116/60 98.2  F (36.8  C) Oral -- 16 99 % -- --   06/10/21 1815 103/52 -- -- -- 18 99 % -- --   06/10/21 1738 114/65 98.3  F (36.8  C) Oral -- 16 98 % -- --   06/10/21 1708 106/57 -- -- -- 16 93 % -- --   06/10/21 1637 114/65 -- -- -- 16 94 % -- --   06/10/21 1632 110/63 -- -- -- -- 95 % -- --   06/10/21 1631 -- -- -- -- -- 95 % -- --   06/10/21 1628 116/64 -- -- -- 18 95 % -- --   06/10/21 1623 114/66 -- -- -- -- 93 % -- --   06/10/21 1621 -- -- -- -- -- 93 % -- --   06/10/21 1617 118/67 -- -- -- -- 95 % -- --   06/10/21 1615 -- 97.9  F (36.6  C) Oral -- -- -- -- --   06/10/21 1613 111/64 -- -- -- 18 96 % -- --   06/10/21 1607 114/67 -- -- -- -- 95 % -- --   06/10/21 1605 (!) 141/63 -- -- -- -- 95 % -- --   06/10/21 1603 121/57 -- -- -- -- 95 % -- --   06/10/21 1600 104/57 -- -- -- -- 96 % -- --   06/10/21 1558 103/60 -- -- -- 16 96 % -- --   06/10/21 1556 -- -- -- -- -- 96 % -- --   06/10/21 1554 92/55 -- -- -- 18 94 % -- --   06/10/21 1548 112/66 -- -- -- 20 98 % -- --   06/10/21 1543 111/66 -- -- -- -- -- -- --   06/10/21 1541 108/64 -- -- -- 20 98 % -- --   06/10/21 1539 119/67 -- -- -- -- 99 % -- --   06/10/21 1537 118/62 -- -- -- -- 99 % -- --   06/10/21 1535 120/67 -- -- -- -- 100 % -- --   06/10/21 1533 116/68 98.1  F (36.7  C) Oral -- 18 100 % -- --   06/10/21 1531 (!) 142/65 -- -- -- -- 100 % -- --   06/10/21 1529 (!) 147/77 -- -- -- 18 -- -- --   06/10/21 1402 (!) 157/78 98.6  F (37  C) Oral 95 -- -- 1.727 m (5' 8\") 83.9 kg (185 lb)     General appearance: comfortable with labor epidural, in hands and knees on birthing ball  CONTRACTIONS: every 3-6 minutes and moderate  Pitocin- none,  Antibiotics- none  FETAL " HEART TONES: continuous EFM- baseline 125 with moderate variability and positive accelerations. No decelerations.  ROM: meconium fluid   PELVIC EXAM: deferred    ASSESSMENT:  ==============  IUP @ 41w6d in active labor   Fetal Heart Rate Tracing category one  GBS- negative,     PLAN:  ===========  Discussed expiration in GBS results and recommendation for antibiotics if > 18 hours ROM.  Will discuss further at that time.   Frequent position changes to facilitate labor with epidural anesthesia.  Reevaluate in 1-2 hours prn     PARK Jones CNM

## 2021-06-11 NOTE — PROVIDER NOTIFICATION
06/11/21 0250   Provider Notification   Provider Name/Title VIC Pathak CNM   Method of Notification At Bedside   Request Evaluate in Person   Notification Reason Decels;SVE   CNM at bedside for FHR and SVE.

## 2021-06-11 NOTE — PROVIDER NOTIFICATION
06/11/21 0002   Provider Notification   Provider Name/Title VIC Pathak CNBESSIE   Method of Notification At Bedside   Request Evaluate in Person   Notification Reason SVE;Labor Status   CNM at bedside for SVE and discussion of POC.

## 2021-06-11 NOTE — PROVIDER NOTIFICATION
06/11/21 0152   Provider Notification   Provider Name/Title VIC Pathak CNM   Method of Notification At Bedside   Request Evaluate in Person   Notification Reason SVE;Labor Status   CNM at bedside for SVE and POC discussion. CNM ordered pitocin to augment labor. Pt agreeable with plan.

## 2021-06-11 NOTE — PLAN OF CARE
Patient arrived to [facility] unit via wheelchair at 0841,with belongings, accompanied by spouse/ significant other, with infant in arms. Received report from Beth Lawler and checked bands. Unit and room orientation completd. Call light within arms reach; no concerns present at this time. Continue with plan of care.

## 2021-06-11 NOTE — PLAN OF CARE
Vital signs stable and FF at U/1 with small lochia. The sai area is slight swollen and pt is using tucks for comfort. Pt was up to void and the right leg is still a littler numb around the knee per mom. Pt is breastfeeding with assist. Complains of sai soreness and feeling sharp pain during voiding. Medicated pt with Ibuprofen and Tylenol for pain control. Applied tucks to sai area. Encouraged pt to soak in the tub twice a day. Assisted pt with breastfeeding. Continue cares, assist with breastfeeding and check vitals every 4 hours per CNM.

## 2021-06-11 NOTE — L&D DELIVERY NOTE
DELIVERY NOTE:  Zulema Layton is a 30 year old  at 42w0d who presented to [facility] as transfer from [facility] for elevated blood pressures in labor and pain management.  Pt received epidural at 1530 with adequate pain relief.  Labor progressed spontaneously to 9/100/+1.  Pitocin was started at 0158 d/t ineffective contraction pattern.  FHR with prolonged deceleration at 0243 and Pitocin was discontinued. Intrauterine resuscitative measures were used for FHR recovery to baseline. Progressed to complete and +1 at 0315.  Pushed effectively with CNM coaching. FHR with variable decels with pushing effort, moderate variability throughout. Head delivered OA and restituted to LOP. No nuchal cord. Shoulders easily delivered under maternal effort.  Live male  delivered at 0442 under epidural anesthesia.  Spontaneous breath, vigorous cry, well flexed, HR>100. Infant directly to maternal abdomen, skin to skin. Delayed cord clamping until cord stopped pulsing, then clamped x2 and cut by . 20 units of pitocin infusing after baby with pt's consent. Cord blood obtained for typing.  Intact placenta spontaneously delivered via Pascal at 0447.  3 vessel cord. True knot noted. Fundus midline and firm with massage.  Vagina, perineum, and rectum inspected, small 2nd degree laceration and L vaginal laceration noted. 2nd degree laceration repaired with 3-0 and 4-0 vicryl suture in the usual fashion. Hemostasis noted. Mother and infant stable; continued skin to skin. Good family bonding observed.     Delivery Note:   IUP at 42 weeks gestation delivered on 2021.     delivery of a viable Male infant.  Weight : pending  Apgars of 8 at 1 minute and 9 at 5 minutes.  Labor was spontaneous.  Medications administered  in labor:  Pain Rx Epidural; Antibiotics No; Other n/a  Perineum: 2nd degree, Vaginal Laceration  Placenta-mechanism: spontaneous, intact,  with a 3 vessel cord. IV oxytocin was given After  delivery of baby  Quantitative Blood Loss was 464cc.   Complications of labor and delivery: Category two FHT tracing and Meconium stained fluid  Anticipated Discharge Date: 21  Birth attendants: SUSANNAH Silva APRN CNM      Delivery Summary    Zulema Layton MRN# [id number]   Age: 30 year old YOB: 1990     ASSESSMENT & PLAN:   , 2nd deg repair     [place], Male-Zulema [[id number]]    Labor Event Times    Labor onset date: 6/10/21 Onset time:  9:00 AM   Dilation complete date: 21 Complete time:  3:19 AM   Start pushing date/time: 2021 0325      Labor Events     labor?: No   steroids: None  Labor Type: Spontaneous  Predominate monitoring during 1st stage: continuous electronic fetal monitoring     Antibiotics received during labor?: No     Rupture identifier: Sac 1  Rupture date/time: 6/10/21 1200   Rupture type: Artificial Rupture of Membranes  Fluid color: Meconium     1:1 continuous labor support provided by?:  Labor partogram used?: no      Delivery/Placenta Date and Time    Delivery Date: 21 Delivery Time:  4:42 AM   Placenta Date/Time: 2021  4:47 AM  Delivering clinician: Larry Pathak APRN CNM   Other personnel present at delivery:  Provider Role   Lynn Morejon RN Registered Nurse         Vaginal Counts     Initial count performed by 2 team members:  Two Team Members   Larry Pathak CNM, Veronica RN       Needles Suture Needles Sponges (RETIRED) Instruments   Initial counts 2 0 5    Added to count  2     Relief counts       Final counts 2 2 5          Placed during labor Accounted for at the end of labor   FSE No NA   IUPC No NA   Cervadil No NA              Final count performed by 2 team members:  Two Team Members   Larry Pathak CNM, RN      Final count correct?: Yes     Cord    Vessels: 3 Vessels                Delayed cord clamping?: Yes    Cord Clamping Delay (seconds):  seconds        Skin to Skin and Feeding Plan    Skin to skin initiation date/time: 1/6/1841    Skin to skin with: Mother  Skin to skin end date/time:        Delivery (Maternal) (Provider to Complete) ([id number])       Blood Loss  Mother: Zulema Layton #[id number]   Start of Mother's Information    IO Blood Loss  06/10/21 0900 - 06/11/21 0515    None           End of Mother's Information  Mother: Zulema Layton #[id number]          Delivery - Provider to Complete ([id number])    Delivering clinician: Larry Pathak APRN CNM                   Other personnel:  Provider Role   Lynn Morejon RN Registered Nurse                Placenta    Date/Time: 6/11/2021  4:47 AM           Anesthesia    Method: Epidural  Cervical dilation at placement: 4-7                       PARK Jones CNM

## 2021-06-11 NOTE — PROGRESS NOTES
"Blood pressure 100/55, pulse 95, temperature 98.2  F (36.8  C), temperature source Oral, resp. rate 19, height 1.727 m (5' 8\"), weight 83.9 kg (185 lb), last menstrual period 2020, SpO2 97 %.    General appearance: comfortable  CONTRACTIONS: every 2.5-4 minutes and moderate  Pitocin- none,  Antibiotics- none  FETAL HEART TONES: continuous EFM- baseline 125 with moderate variability and positive accelerations. No decelerations.  ROM:  Meconium-stained fluid  PELVIC EXAM: 7.5-8cm/ 100%/ 0  EFW: 8 lbs    ASSESSMENT:  ==============  IUP @ 41w6d in active labor   Fetal Heart Rate Tracing category one  GBS- negative,   ROM x 8.5 hrs, afebrile    PLAN:  ===========  Reviewed birth preferences with patient.   Frequent position changes to facilitate labor with epidural anesthesia.  Reevaluate in 2-4 hours prn     PARK JonesM    "

## 2021-06-11 NOTE — PLAN OF CARE
Vaginal Delivery Note   of viable Male with ELIA Pathak CNM in attendance.  NICU/Nursery RN Thao RODRIGEZ RN present.  Infant with spontaneous cry, to mother's abdomen, dried and stimulated.  APGAR at 1 minute: 8 and APGAR at 5 minutes: 9.  Placenta delivered with out complication, oxytocin started after cord claming at 340ml/hr., 2nd degree  laceration with repair, left vaginal laceration without repair, sai cares provided.  Mother and baby in stable condition.

## 2021-06-11 NOTE — PROGRESS NOTES
"Post Partum Note  SIGNIFICANT PROBLEMS:  Patient Active Problem List    Diagnosis Date Noted      (normal spontaneous vaginal delivery) 2021     Priority: Medium     Gestational hypertension, third trimester 06/10/2021     Priority: Medium     Pregnancy with prenatal care elsewhere in third trimester 06/10/2021     Priority: Medium     6/10/2021   Pt transferring by car from [place]  Reason for transfer to [facility] - Elevated blood pressure - had non sustained severe range at   Pt was receiving prenatal care from [facility]  Transferring midwife name(s),/birth center & phone number: Ramya [phone]  Midwife here supporting patient: Chauncey Dhillon with  Archie and kayla Valladares  Records received, reviewed and scanned into chart.   Undecided for  care in the hospital  Undecided  care after D/C    Pt is a chiropractor                       INTERVAL HISTORY:  /74   Pulse 66   Temp 97.8  F (36.6  C) (Oral)   Resp 16   Ht 1.727 m (5' 8\")   Wt 83.9 kg (185 lb)   LMP 2020   SpO2 100%   Breastfeeding Unknown   BMI 28.13 kg/m    Patient Vitals for the past 24 hrs:   BP Temp Temp src Pulse Resp SpO2   21 1328 113/74 97.8  F (36.6  C) Oral 66 16 --   21 1319 -- -- -- -- -- 100 %   21 1140 -- -- -- -- 16 97 %   21 0908 109/69 98.2  F (36.8  C) Oral 105 18 97 %   21 0645 114/64 -- -- -- -- 97 %   21 0630 118/63 98.6  F (37  C) Oral -- 16 96 %   21 0615 116/63 -- -- -- -- 96 %   21 0605 115/65 -- -- -- -- 96 %   21 0545 111/64 -- -- -- -- 98 %   21 0530 108/58 -- -- -- -- 97 %   21 0520 115/59 98.6  F (37  C) Oral -- 18 --   21 0505 129/66 -- -- -- -- 98 %   21 0445 132/72 -- -- -- -- 98 %   21 0442 132/72 -- -- -- -- 99 %   21 0415 130/62 -- -- -- -- 98 %   21 0400 -- 98.3  F (36.8  C) Oral -- 22 --   21 0353 133/61 -- -- -- -- 100 %   21 0215 (!) 140/75 -- -- -- -- 100 % "   21 0200 -- 98.3  F (36.8  C) Oral -- 18 --   21 0115 126/75 -- -- -- -- 98 %   21 0100 -- 98.5  F (36.9  C) Oral -- 20 --   21 0015 115/70 -- -- -- -- 98 %   21 0000 -- 98.3  F (36.8  C) Oral -- 18 99 %   06/10/21 2315 116/67 98.5  F (36.9  C) Oral -- 14 100 %   06/10/21 2216 125/68 97.9  F (36.6  C) Oral -- 16 100 %   06/10/21 2117 116/72 98.2  F (36.8  C) Oral -- -- 100 %   06/10/21 2016 -- 97.9  F (36.6  C) Oral -- -- --   06/10/21 2009 123/65 -- -- -- 16 98 %   06/10/21 1938 121/73 -- -- -- 16 95 %   06/10/21 1915 100/55 -- -- -- 19 97 %   06/10/21 1845 116/60 98.2  F (36.8  C) Oral -- 16 99 %   06/10/21 1815 103/52 -- -- -- 18 99 %   06/10/21 1738 114/65 98.3  F (36.8  C) Oral -- 16 98 %   06/10/21 1708 106/57 -- -- -- 16 93 %   06/10/21 1637 114/65 -- -- -- 16 94 %   06/10/21 1632 110/63 -- -- -- -- 95 %   06/10/21 163 -- -- -- -- -- 95 %   06/10/21 1628 116/64 -- -- -- 18 95 %   06/10/21 1623 114/66 -- -- -- -- 93 %   06/10/21 162 -- -- -- -- -- 93 %   06/10/21 1617 118/67 -- -- -- -- 95 %   06/10/21 1615 -- 97.9  F (36.6  C) Oral -- -- --   06/10/21 1613 111/64 -- -- -- 18 96 %   06/10/21 1607 114/67 -- -- -- -- 95 %   06/10/21 1605 (!) 141/63 -- -- -- -- 95 %   06/10/21 1603 121/57 -- -- -- -- 95 %   06/10/21 1600 104/57 -- -- -- -- 96 %   06/10/21 1558 103/60 -- -- -- 16 96 %   06/10/21 1556 -- -- -- -- -- 96 %   06/10/21 1554 92/55 -- -- -- 18 94 %   Pt stable, baby is rooming in. Feels well. No HA/ vision change/ epigastric pain  Pt was transfer from [facility] and was hoping for early discharge today. Discussed preeclampsia without severe features and recommendation to stay at least 24 hours for BP monitoring. Pt agreeable.  Breast feeding status:initiated and getting lactation help  Complications since 2 hours post delivery: None  # Pain Assessment:  Current Pain Score 2021   Patient currently in pain? yes   - Zulema is experiencing pain due to . Pain  management was discussed with Zulema and her spouse and the plan was created in a collaborative fashion.  Zulema's response to the current recommendations: engaged  - tylenol, ibup, sitz baths and tucks    Patient is tolerating activity well, Voiding without difficulty, cramping is relieved by Ibuprophen, lochia is decreasing and patient denies clots.  Perineal pain is is relieved by Ibuprophen.  The perineum laceration is well approximated    Physical Exam:  General: Bright affect, loving with baby. Family supportive. No HA/ vision change/ epigastric pain  Breasts: soft, nontender, nipples intact, no cracking, redness or bruising.   Abdomen: soft, nontender, fundus below U.   Lochia: small amount, rubra, no clots or odor.   Perineum: well-approximated.   Extremities: no edema, Kimberly's negative.     Postpartum hemoglobin - will draw this evening  Blood type   Lab Results   Component Value Date    ABO O 06/10/2021       Lab Results   Component Value Date    RH Pos 06/10/2021     Rubella status unclear per transfer records, no prenatal notes that indicate pt is rubella is not immune. Pt states all new OB labs were wnl.  History of depression: none. Postpartum depression warning signs reviewed.    ASSESSMENT/PLAN:  Normal postpartum exam , Stable Post-partum day of delivery  Complications:preeclampsia without severe features  Plan d/c home tomorrow. Home Visit Ordered- Yes: pt will check with [place] for Follow-up care for elevated BPs. Plan home visit in 1 days and Follow-up with provider in 3-7 days. Pt agreeable to plan  RTC 1 week BP check and 6 week postpartum check  Postpartum warning s/s reviewed, including bleeding/clots, fever, mastitis, or depression  Reviewed warning sx of HA/ vision change/ epigastric pain/ increased edema to call.  Continue prenatal vitamins, pt has own tylenol, ibuprofen, and senokot  Birthcontrol planned:IUD Paragard, plan to insert 6-8 wks postpartum. Has used successfully in  past  Current Discharge Medication List      START taking these medications    Details   acetaminophen (TYLENOL) 325 MG tablet Take 2 tablets (650 mg) by mouth every 4 hours as needed for mild pain or fever  Qty: 50 tablet, Refills: 0    Associated Diagnoses:  (normal spontaneous vaginal delivery)      ibuprofen (ADVIL/MOTRIN) 200 MG tablet Take 3 tablets (600 mg) by mouth every 6 hours as needed for other (cramping)  Qty: 50 tablet, Refills: 0    Associated Diagnoses:  (normal spontaneous vaginal delivery)      senna-docusate (SENOKOT-S/PERICOLACE) 8.6-50 MG tablet Take 1 tablet by mouth daily as needed for constipation  Qty: 20 tablet, Refills: 0    Associated Diagnoses:  (normal spontaneous vaginal delivery)         CONTINUE these medications which have NOT CHANGED    Details   omega 3 1000 MG CAPS       Prenatal Vit-Fe Fumarate-FA (PRENATAL MULTIVITAMIN W/IRON) 27-0.8 MG tablet Take 1 tablet by mouth daily      Vitamin D3 (CHOLECALCIFEROL) 125 MCG (5000 UT) tablet Take by mouth daily           PARK Becker CNM

## 2021-06-12 VITALS
HEIGHT: 68 IN | WEIGHT: 185 LBS | TEMPERATURE: 97.6 F | OXYGEN SATURATION: 100 % | HEART RATE: 76 BPM | BODY MASS INDEX: 28.04 KG/M2 | DIASTOLIC BLOOD PRESSURE: 86 MMHG | RESPIRATION RATE: 16 BRPM | SYSTOLIC BLOOD PRESSURE: 122 MMHG

## 2021-06-12 LAB — HGB BLD-MCNC: 10.9 G/DL (ref 11.7–15.7)

## 2021-06-12 PROCEDURE — 85018 HEMOGLOBIN: CPT | Performed by: ADVANCED PRACTICE MIDWIFE

## 2021-06-12 PROCEDURE — 250N000013 HC RX MED GY IP 250 OP 250 PS 637: Performed by: ADVANCED PRACTICE MIDWIFE

## 2021-06-12 PROCEDURE — 36415 COLL VENOUS BLD VENIPUNCTURE: CPT | Performed by: ADVANCED PRACTICE MIDWIFE

## 2021-06-12 RX ADMIN — DOCUSATE SODIUM AND SENNOSIDES 1 TABLET: 8.6; 5 TABLET ORAL at 08:33

## 2021-06-12 RX ADMIN — IBUPROFEN 800 MG: 800 TABLET, FILM COATED ORAL at 05:25

## 2021-06-12 RX ADMIN — ACETAMINOPHEN 650 MG: 325 TABLET, FILM COATED ORAL at 00:37

## 2021-06-12 RX ADMIN — ACETAMINOPHEN 650 MG: 325 TABLET, FILM COATED ORAL at 05:25

## 2021-06-12 RX ADMIN — ACETAMINOPHEN 650 MG: 325 TABLET, FILM COATED ORAL at 10:25

## 2021-06-12 NOTE — PLAN OF CARE
Data: Vital signs within normal limits. Postpartum checks within normal limits - see flow record. Patient eating and drinking normally. Patient able to empty bladder independently and is up ambulating. No apparent signs of infection. Patient performing self cares and is able to care for infant. Patient is breastfeeding and dong hand expression with finger feeding.  Patient would benefit from further breastfeeding help and resources.   Action: Patient medicated during the shift for pain and cramping. See MAR. Patient reassessed within 1 hour after each medication and pain was improved - patient stated she was comfortable. Patient education done about breastfeeding. See flow record.  Response: Positive attachment behaviors observed with infant. Support persons was present and attentive.   Plan: Anticipate discharge today.

## 2021-06-12 NOTE — PLAN OF CARE
VSS. Pt caring for self & baby independently. Breastfeeding independently, observed good latch this a.m. Discharge instructions reviewed, pt states she has no further questions/concerns @ this time. Plans to follow-up with [facility] midwife on Monday for BP check. Discharged to home with baby.

## 2021-06-12 NOTE — PLAN OF CARE
VSS, denies HA, chest pain, SOB, vision change, RUQ pain. postpartum assessments WDL. Fundus firm at U/2. Scant lochia. Voiding without difficulty and tolerating regular diet. Up and ambulating with steady gait.  Independent with cares.  Bonding well with infant. Breastfeeding on cue with assistance, and also supplementing with hand expressed maternal milk. Pain managed with tylenol and Ibuprofen. Will continue with postpartum cares and education per plan of care.

## 2021-06-12 NOTE — DISCHARGE SUMMARY
[facility] Discharge Summary    Zulema Layton MRN# [id number]   Age: 30 year old YOB: 1990     Date of Admission:  6/10/2021  Date of Discharge::  2021  Admitting Physician:  PARK Paul CNM  Discharge Physician:  PARK Victoria CNM, SUSANNAH, MS      Home clinic: [facility]           Admission Diagnoses:   Elevated blood pressure affecting pregnancy in third trimester, antepartum [O16.3]   (normal spontaneous vaginal delivery) [O80]          Discharge Diagnosis:   Normal spontaneous vaginal delivery  Intrauterine pregnancy at 42 weeks gestation          Procedures:   Procedure(s): No additional procedures performed               Medications Prior to Admission:     Medications Prior to Admission   Medication Sig Dispense Refill Last Dose     omega 3 1000 MG CAPS    Past Week at Unknown time     Prenatal Vit-Fe Fumarate-FA (PRENATAL MULTIVITAMIN W/IRON) 27-0.8 MG tablet Take 1 tablet by mouth daily   Past Week at Unknown time     Vitamin D3 (CHOLECALCIFEROL) 125 MCG (5000 UT) tablet Take by mouth daily   Past Week at Unknown time             Discharge Medications:     Current Discharge Medication List      START taking these medications    Details   acetaminophen (TYLENOL) 325 MG tablet Take 2 tablets (650 mg) by mouth every 4 hours as needed for mild pain or fever  Qty: 50 tablet, Refills: 0    Associated Diagnoses:  (normal spontaneous vaginal delivery)      ibuprofen (ADVIL/MOTRIN) 200 MG tablet Take 3 tablets (600 mg) by mouth every 6 hours as needed for other (cramping)  Qty: 50 tablet, Refills: 0    Associated Diagnoses:  (normal spontaneous vaginal delivery)      senna-docusate (SENOKOT-S/PERICOLACE) 8.6-50 MG tablet Take 1 tablet by mouth daily as needed for constipation  Qty: 20 tablet, Refills: 0    Associated Diagnoses:  (normal spontaneous vaginal delivery)         CONTINUE these medications which have NOT CHANGED    Details   omega 3 1000 MG  CAPS       Prenatal Vit-Fe Fumarate-FA (PRENATAL MULTIVITAMIN W/IRON) 27-0.8 MG tablet Take 1 tablet by mouth daily      Vitamin D3 (CHOLECALCIFEROL) 125 MCG (5000 UT) tablet Take by mouth daily                   Consultations:   No consultations were requested during this admission          Brief History of Labor:   DELIVERY NOTE:  Zulema Layton is a 30 year old  at 42w0d who [facility] for elevated blood pressures in labor and pain management.  Pt received epidural at 1530 with adequate pain relief.  Labor progressed spontaneously to 9/100/+1.  Pitocin was started at 0158 d/t ineffective contraction pattern.  FHR with prolonged deceleration at 0243 and Pitocin was discontinued. Intrauterine resuscitative measures were used for FHR recovery to baseline. Progressed to complete and +1 at 0315.  Pushed effectively with CNM coaching. FHR with variable decels with pushing effort, moderate variability throughout. Head delivered OA and restituted to LOP. No nuchal cord. Shoulders easily delivered under maternal effort.  Live male  delivered at 0442 under epidural anesthesia.  Spontaneous breath, vigorous cry, well flexed, HR>100. Infant directly to maternal abdomen, skin to skin. Delayed cord clamping until cord stopped pulsing, then clamped x2 and cut by . 20 units of pitocin infusing after baby with pt's consent. Cord blood obtained for typing.  Intact placenta spontaneously delivered via Pascal at 0447.  3 vessel cord. True knot noted. Fundus midline and firm with massage.  Vagina, perineum, and rectum inspected, small 2nd degree laceration and L vaginal laceration noted. 2nd degree laceration repaired with 3-0 and 4-0 vicryl suture in the usual fashion. Hemostasis noted. Mother and infant stable; continued skin to skin. Good family bonding observed.      Delivery Note:   IUP at 42 weeks gestation delivered on 2021.     delivery of a viable  "Male infant.  Weight : pending  Apgars of 8 at 1 minute and 9 at 5 minutes.  Labor was spontaneous.  Medications administered  in labor:  Pain Rx Epidural; Antibiotics No; Other n/a  Perineum: 2nd degree, Vaginal Laceration  Placenta-mechanism: spontaneous, intact,  with a 3 vessel cord. IV oxytocin was given After delivery of baby  Quantitative Blood Loss was 464cc.   Complications of labor and delivery: Category two FHT tracing and Meconium stained fluid  Anticipated Discharge Date: 6/14/21  Birth attendants: SUSANNAH Silva APRN CNM     Assessment Day of Discharge    Vital signs:  Patient Vitals for the past 24 hrs:   BP Temp Temp src Pulse Resp SpO2   06/12/21 0500 107/68 98  F (36.7  C) Oral 76 16 --   06/11/21 2134 114/81 98.1  F (36.7  C) Oral 92 16 --   06/11/21 1736 106/72 98  F (36.7  C) Oral 72 16 --   06/11/21 1328 113/74 97.8  F (36.6  C) Oral 66 16 --   06/11/21 1319 -- -- -- -- -- 100 %   06/11/21 1140 -- -- -- -- 16 97 %   06/11/21 0908 109/69 98.2  F (36.8  C) Oral 105 18 97 %     Temp: 98  F (36.7  C) Temp src: Oral BP: 107/68 Pulse: 76   Resp: 16 SpO2: 100 %     Height: 172.7 cm (5' 8\") Weight: 83.9 kg (185 lb)  Estimated body mass index is 28.13 kg/m  as calculated from the following:    Height as of this encounter: 1.727 m (5' 8\").    Weight as of this encounter: 83.9 kg (185 lb).      Breasts: Soft, filling  Nipples: Intact, Non-tender  Abdomen: Soft, Non-tender    Uterus: Fundus Firm, Non-tender, located  below the umbilicus   Lochia: Rubra, appropriate amount    Perineum:  Well-approximated, healing well  Lower Extremities:  Edema Bilateral, Negative Kimberly's Sign           Hospital Course:   The patient's hospital course was unremarkable.  On discharge, her pain was well controlled. Vaginal bleeding is similar to peak menstrual flow.  Voiding without difficulty.  Ambulating well and tolerating a normal diet.  No fever.  Breastfeeding well.  Infant is stable.   bowel " movement. Mood , has  family supports identified.   She was discharged on post-partum day #`.    Post-partum hemoglobin:   Hemoglobin   Date Value Ref Range Status   2021 11.2 (L) 11.7 - 15.7 g/dL Final        Rh:pos, Rhogam  notgiven   Rubella status:immune  Plan for contraception: will follow up with [facility]   Reviewed Chapter One of  [facility] including warning signs of postpartum, activity level, avoiding IC for 6 weeks, Tub soaks BID, Kegels, abdominal exercises, breast care,  postpartum depression/anxiety. - Reviewed how to establish/maintain milk supply, nipple care, pumping for storage, fore/hind milk, wet/dirty diapers to expect each day, resources prn if questions or concerns.  Pt verbalized understanding with teach back.          Discharge Instructions and Follow-Up:   Discharge diet: Regular, Iron Rich, High Fiber, Minimum 80oz water daily   Discharge activity: Pelvic rest: abstain from intercourse and do not use tampons for 6 week(s)   Discharge follow-up: Follow up with 2-3 days for BP check   Follow up with midwife  in 2 and 6  weeks   Wound care: Drink plenty of fluids  Ice to area for comfort   Keep wound clean and dry   Avoid constipation   Sitz bath TID            Discharge Disposition:   Discharged to home           PARK Victoria CNM

## 2022-02-17 ENCOUNTER — APPOINTMENT (OUTPATIENT)
Dept: URBAN - METROPOLITAN AREA CLINIC 253 | Age: 32
Setting detail: DERMATOLOGY
End: 2022-02-21

## 2022-02-17 VITALS — WEIGHT: 140 LBS | RESPIRATION RATE: 14 BRPM | HEIGHT: 68 IN

## 2022-02-17 DIAGNOSIS — Z71.89 OTHER SPECIFIED COUNSELING: ICD-10-CM

## 2022-02-17 DIAGNOSIS — L81.4 OTHER MELANIN HYPERPIGMENTATION: ICD-10-CM

## 2022-02-17 DIAGNOSIS — L21.8 OTHER SEBORRHEIC DERMATITIS: ICD-10-CM

## 2022-02-17 DIAGNOSIS — L81.3 CAFÉ AU LAIT SPOTS: ICD-10-CM

## 2022-02-17 DIAGNOSIS — L71.8 OTHER ROSACEA: ICD-10-CM

## 2022-02-17 DIAGNOSIS — D22 MELANOCYTIC NEVI: ICD-10-CM

## 2022-02-17 DIAGNOSIS — D18.0 HEMANGIOMA: ICD-10-CM

## 2022-02-17 DIAGNOSIS — L91.8 OTHER HYPERTROPHIC DISORDERS OF THE SKIN: ICD-10-CM

## 2022-02-17 PROBLEM — D18.01 HEMANGIOMA OF SKIN AND SUBCUTANEOUS TISSUE: Status: ACTIVE | Noted: 2022-02-17

## 2022-02-17 PROBLEM — D22.71 MELANOCYTIC NEVI OF RIGHT LOWER LIMB, INCLUDING HIP: Status: ACTIVE | Noted: 2022-02-17

## 2022-02-17 PROBLEM — D22.5 MELANOCYTIC NEVI OF TRUNK: Status: ACTIVE | Noted: 2022-02-17

## 2022-02-17 PROCEDURE — 99203 OFFICE O/P NEW LOW 30 MIN: CPT

## 2022-02-17 PROCEDURE — OTHER MIPS QUALITY: OTHER

## 2022-02-17 PROCEDURE — OTHER COUNSELING: OTHER

## 2022-02-17 PROCEDURE — OTHER ADDITIONAL NOTES: OTHER

## 2022-02-17 ASSESSMENT — LOCATION DETAILED DESCRIPTION DERM
LOCATION DETAILED: LEFT MEDIAL FRONTAL SCALP
LOCATION DETAILED: RIGHT CENTRAL MALAR CHEEK
LOCATION DETAILED: LEFT AXILLARY VAULT
LOCATION DETAILED: INFERIOR THORACIC SPINE
LOCATION DETAILED: RIGHT MEDIAL PLANTAR MIDFOOT
LOCATION DETAILED: LEFT INFERIOR LATERAL NECK
LOCATION DETAILED: LEFT CENTRAL MALAR CHEEK
LOCATION DETAILED: LEFT BUTTOCK

## 2022-02-17 ASSESSMENT — LOCATION ZONE DERM
LOCATION ZONE: TRUNK
LOCATION ZONE: AXILLAE
LOCATION ZONE: FEET
LOCATION ZONE: FACE
LOCATION ZONE: NECK
LOCATION ZONE: SCALP

## 2022-02-17 ASSESSMENT — LOCATION SIMPLE DESCRIPTION DERM
LOCATION SIMPLE: LEFT SCALP
LOCATION SIMPLE: UPPER BACK
LOCATION SIMPLE: RIGHT CHEEK
LOCATION SIMPLE: LEFT BUTTOCK
LOCATION SIMPLE: LEFT ANTERIOR NECK
LOCATION SIMPLE: RIGHT PLANTAR SURFACE
LOCATION SIMPLE: LEFT CHEEK
LOCATION SIMPLE: LEFT AXILLARY VAULT

## 2022-02-17 NOTE — PROCEDURE: ADDITIONAL NOTES
Render Risk Assessment In Note?: no
Additional Notes: Pt declined prescription
Additional Notes: If tags become an irritant, pt will schedule treatment
Detail Level: Zone